# Patient Record
Sex: FEMALE | Race: WHITE | NOT HISPANIC OR LATINO | Employment: OTHER | URBAN - METROPOLITAN AREA
[De-identification: names, ages, dates, MRNs, and addresses within clinical notes are randomized per-mention and may not be internally consistent; named-entity substitution may affect disease eponyms.]

---

## 2017-11-09 ENCOUNTER — ESTABLISHED COMPREHENSIVE EXAM (OUTPATIENT)
Dept: URBAN - METROPOLITAN AREA CLINIC 43 | Facility: CLINIC | Age: 82
End: 2017-11-09

## 2017-11-09 DIAGNOSIS — H43.813: ICD-10-CM

## 2017-11-09 DIAGNOSIS — H35.3222: ICD-10-CM

## 2017-11-09 DIAGNOSIS — H35.3212: ICD-10-CM

## 2017-11-09 PROCEDURE — 4177F TALK PT/CRGVR RE AREDS PREV: CPT

## 2017-11-09 PROCEDURE — 92134 CPTRZ OPH DX IMG PST SGM RTA: CPT

## 2017-11-09 PROCEDURE — 2019F DILATED MACUL EXAM DONE: CPT

## 2017-11-09 PROCEDURE — 1036F TOBACCO NON-USER: CPT

## 2017-11-09 PROCEDURE — 67028 INJECTION EYE DRUG: CPT

## 2017-11-09 PROCEDURE — 92014 COMPRE OPH EXAM EST PT 1/>: CPT

## 2017-11-09 PROCEDURE — 9222550 BILAT EXTENDED OPHTHALMOSCOPY, FIRST

## 2017-11-09 PROCEDURE — G8427 DOCREV CUR MEDS BY ELIG CLIN: HCPCS

## 2017-11-09 PROCEDURE — 92250 FUNDUS PHOTOGRAPHY W/I&R: CPT

## 2017-11-09 PROCEDURE — G8785 BP SCRN NO PERF AT INTERVAL: HCPCS

## 2017-11-09 PROCEDURE — 92235 FLUORESCEIN ANGRPH MLTIFRAME: CPT

## 2017-11-09 ASSESSMENT — VISUAL ACUITY
OS_CC: 20/40-1
OD_CC: 20/400

## 2017-11-09 ASSESSMENT — TONOMETRY
OS_IOP_MMHG: 16
OD_IOP_MMHG: 12

## 2017-11-16 ENCOUNTER — ESTABLISHED COMPREHENSIVE EXAM (OUTPATIENT)
Dept: URBAN - METROPOLITAN AREA CLINIC 43 | Facility: CLINIC | Age: 82
End: 2017-11-16

## 2017-11-16 DIAGNOSIS — H43.813: ICD-10-CM

## 2017-11-16 DIAGNOSIS — H35.3212: ICD-10-CM

## 2017-11-16 DIAGNOSIS — H35.3222: ICD-10-CM

## 2017-11-16 DIAGNOSIS — Z96.1: ICD-10-CM

## 2017-11-16 PROCEDURE — 92015 DETERMINE REFRACTIVE STATE: CPT

## 2017-11-16 PROCEDURE — 4040F PNEUMOC VAC/ADMIN/RCVD: CPT

## 2017-11-16 PROCEDURE — 92014 COMPRE OPH EXAM EST PT 1/>: CPT

## 2017-11-16 PROCEDURE — G8785 BP SCRN NO PERF AT INTERVAL: HCPCS

## 2017-11-16 PROCEDURE — G8428 CUR MEDS NOT DOCUMENT: HCPCS

## 2017-11-16 PROCEDURE — G8482 FLU IMMUNIZE ORDER/ADMIN: HCPCS

## 2017-11-16 PROCEDURE — 1036F TOBACCO NON-USER: CPT

## 2017-11-16 ASSESSMENT — VISUAL ACUITY
OD_CC: J5
OS_CC: 20/40-1
OS_CC: J2
OD_SC: 20/200+1
OD_SC: J10
OS_SC: 20/70
OS_SC: J8
OD_CC: 20/100

## 2017-11-16 ASSESSMENT — TONOMETRY
OS_IOP_MMHG: 15
OD_IOP_MMHG: 12

## 2017-11-17 ENCOUNTER — ESTABLISHED PATIENT (OUTPATIENT)
Dept: URBAN - METROPOLITAN AREA CLINIC 43 | Facility: CLINIC | Age: 82
End: 2017-11-17

## 2017-11-17 DIAGNOSIS — H35.3222: ICD-10-CM

## 2017-11-17 PROCEDURE — 67028 INJECTION EYE DRUG: CPT

## 2017-11-17 ASSESSMENT — VISUAL ACUITY
OS_CC: 20/40
OD_SC: 20/200
OS_SC: 20/70
OD_CC: 20/100+1

## 2017-11-17 ASSESSMENT — TONOMETRY
OS_IOP_MMHG: 11
OD_IOP_MMHG: 13

## 2018-01-05 ENCOUNTER — ESTABLISHED PATIENT (OUTPATIENT)
Dept: URBAN - METROPOLITAN AREA CLINIC 43 | Facility: CLINIC | Age: 83
End: 2018-01-05

## 2018-01-05 DIAGNOSIS — H35.363: ICD-10-CM

## 2018-01-05 DIAGNOSIS — H35.3222: ICD-10-CM

## 2018-01-05 DIAGNOSIS — H35.3212: ICD-10-CM

## 2018-01-05 DIAGNOSIS — H43.813: ICD-10-CM

## 2018-01-05 PROCEDURE — 92012 INTRM OPH EXAM EST PATIENT: CPT

## 2018-01-05 PROCEDURE — G8785 BP SCRN NO PERF AT INTERVAL: HCPCS

## 2018-01-05 PROCEDURE — 92134 CPTRZ OPH DX IMG PST SGM RTA: CPT

## 2018-01-05 PROCEDURE — G8428 CUR MEDS NOT DOCUMENT: HCPCS

## 2018-01-05 PROCEDURE — 67028 INJECTION EYE DRUG: CPT

## 2018-01-05 PROCEDURE — 2019F DILATED MACUL EXAM DONE: CPT

## 2018-01-05 PROCEDURE — 1036F TOBACCO NON-USER: CPT

## 2018-01-05 PROCEDURE — 4177F TALK PT/CRGVR RE AREDS PREV: CPT

## 2018-01-05 ASSESSMENT — TONOMETRY
OS_IOP_MMHG: 11
OD_IOP_MMHG: 10

## 2018-01-05 ASSESSMENT — VISUAL ACUITY
OD_CC: 20/70+1
OS_CC: 20/40

## 2018-01-12 ENCOUNTER — ESTABLISHED PATIENT (OUTPATIENT)
Dept: URBAN - METROPOLITAN AREA CLINIC 43 | Facility: CLINIC | Age: 83
End: 2018-01-12

## 2018-01-12 DIAGNOSIS — H35.3221: ICD-10-CM

## 2018-01-12 PROCEDURE — 67028 INJECTION EYE DRUG: CPT

## 2018-01-12 ASSESSMENT — VISUAL ACUITY
OS_PH: 20/40-1
OD_SC: 20/200-1
OS_SC: 20/60-1

## 2018-01-12 ASSESSMENT — TONOMETRY: OS_IOP_MMHG: 15

## 2018-03-09 ENCOUNTER — ESTABLISHED PATIENT (OUTPATIENT)
Dept: URBAN - METROPOLITAN AREA CLINIC 43 | Facility: CLINIC | Age: 83
End: 2018-03-09

## 2018-03-09 DIAGNOSIS — H35.363: ICD-10-CM

## 2018-03-09 DIAGNOSIS — H35.3212: ICD-10-CM

## 2018-03-09 DIAGNOSIS — H35.3221: ICD-10-CM

## 2018-03-09 DIAGNOSIS — H35.733: ICD-10-CM

## 2018-03-09 DIAGNOSIS — H43.813: ICD-10-CM

## 2018-03-09 PROCEDURE — 92014 COMPRE OPH EXAM EST PT 1/>: CPT

## 2018-03-09 PROCEDURE — 92275 ELECTRORETINOGRAPHY: CPT

## 2018-03-09 PROCEDURE — 92250 FUNDUS PHOTOGRAPHY W/I&R: CPT

## 2018-03-09 PROCEDURE — 6702850 BILATERAL INTRAVITREAL INJECTION

## 2018-03-09 PROCEDURE — 4177F TALK PT/CRGVR RE AREDS PREV: CPT

## 2018-03-09 PROCEDURE — G8785 BP SCRN NO PERF AT INTERVAL: HCPCS

## 2018-03-09 PROCEDURE — 92235 FLUORESCEIN ANGRPH MLTIFRAME: CPT

## 2018-03-09 PROCEDURE — 1036F TOBACCO NON-USER: CPT

## 2018-03-09 PROCEDURE — 2019F DILATED MACUL EXAM DONE: CPT

## 2018-03-09 PROCEDURE — G8427 DOCREV CUR MEDS BY ELIG CLIN: HCPCS

## 2018-03-09 PROCEDURE — 9222650 BILAT EXTENDED OPHTHALMOSCOPY, F/U

## 2018-03-09 ASSESSMENT — VISUAL ACUITY
OS_SC: 20/50-1
OD_SC: CF 3FT

## 2018-03-09 ASSESSMENT — TONOMETRY
OS_IOP_MMHG: 17
OD_IOP_MMHG: 10

## 2018-03-23 ENCOUNTER — ESTABLISHED PATIENT (OUTPATIENT)
Dept: URBAN - METROPOLITAN AREA CLINIC 43 | Facility: CLINIC | Age: 83
End: 2018-03-23

## 2018-03-23 DIAGNOSIS — H35.3212: ICD-10-CM

## 2018-03-23 DIAGNOSIS — Z96.1: ICD-10-CM

## 2018-03-23 DIAGNOSIS — H35.3221: ICD-10-CM

## 2018-03-23 DIAGNOSIS — H35.363: ICD-10-CM

## 2018-03-23 DIAGNOSIS — H43.813: ICD-10-CM

## 2018-03-23 DIAGNOSIS — H35.733: ICD-10-CM

## 2018-03-23 PROCEDURE — 1036F TOBACCO NON-USER: CPT

## 2018-03-23 PROCEDURE — 4177F TALK PT/CRGVR RE AREDS PREV: CPT

## 2018-03-23 PROCEDURE — 2019F DILATED MACUL EXAM DONE: CPT

## 2018-03-23 PROCEDURE — 92014 COMPRE OPH EXAM EST PT 1/>: CPT

## 2018-03-23 PROCEDURE — 92015 DETERMINE REFRACTIVE STATE: CPT

## 2018-03-23 PROCEDURE — G8785 BP SCRN NO PERF AT INTERVAL: HCPCS

## 2018-03-23 PROCEDURE — G8428 CUR MEDS NOT DOCUMENT: HCPCS

## 2018-03-23 ASSESSMENT — TONOMETRY
OD_IOP_MMHG: 12
OS_IOP_MMHG: 14

## 2018-03-23 ASSESSMENT — VISUAL ACUITY
OD_SC: J10
OS_SC: J8
OS_CC: J1
OS_CC: 20/40-2
OD_CC: 20/100+2
OS_SC: 20/70+2
OD_CC: J10
OD_SC: 20/100

## 2018-05-11 ENCOUNTER — ESTABLISHED PATIENT (OUTPATIENT)
Dept: URBAN - METROPOLITAN AREA CLINIC 43 | Facility: CLINIC | Age: 83
End: 2018-05-11

## 2018-05-11 DIAGNOSIS — H35.3212: ICD-10-CM

## 2018-05-11 DIAGNOSIS — H35.3221: ICD-10-CM

## 2018-05-11 DIAGNOSIS — H35.363: ICD-10-CM

## 2018-05-11 DIAGNOSIS — H35.30: ICD-10-CM

## 2018-05-11 DIAGNOSIS — H35.733: ICD-10-CM

## 2018-05-11 DIAGNOSIS — H43.813: ICD-10-CM

## 2018-05-11 PROCEDURE — 1036F TOBACCO NON-USER: CPT

## 2018-05-11 PROCEDURE — 2019F DILATED MACUL EXAM DONE: CPT

## 2018-05-11 PROCEDURE — G8785 BP SCRN NO PERF AT INTERVAL: HCPCS

## 2018-05-11 PROCEDURE — 92134 CPTRZ OPH DX IMG PST SGM RTA: CPT

## 2018-05-11 PROCEDURE — 6702850 BILATERAL INTRAVITREAL INJECTION

## 2018-05-11 PROCEDURE — 4177F TALK PT/CRGVR RE AREDS PREV: CPT

## 2018-05-11 PROCEDURE — 9222650 BILAT EXTENDED OPHTHALMOSCOPY, F/U

## 2018-05-11 PROCEDURE — G8427 DOCREV CUR MEDS BY ELIG CLIN: HCPCS

## 2018-05-11 PROCEDURE — 92014 COMPRE OPH EXAM EST PT 1/>: CPT

## 2018-05-11 ASSESSMENT — TONOMETRY
OS_IOP_MMHG: 12
OD_IOP_MMHG: 11

## 2018-05-11 ASSESSMENT — VISUAL ACUITY
OS_CC: 20/40-1
OD_CC: 20/200-1

## 2018-11-16 ENCOUNTER — ESTABLISHED COMPREHENSIVE EXAM (OUTPATIENT)
Dept: URBAN - METROPOLITAN AREA CLINIC 43 | Facility: CLINIC | Age: 83
End: 2018-11-16

## 2018-11-16 DIAGNOSIS — H43.813: ICD-10-CM

## 2018-11-16 DIAGNOSIS — H35.363: ICD-10-CM

## 2018-11-16 DIAGNOSIS — H35.733: ICD-10-CM

## 2018-11-16 DIAGNOSIS — H35.3212: ICD-10-CM

## 2018-11-16 DIAGNOSIS — H35.3221: ICD-10-CM

## 2018-11-16 PROCEDURE — 92134 CPTRZ OPH DX IMG PST SGM RTA: CPT

## 2018-11-16 PROCEDURE — 6702850 BILATERAL INTRAVITREAL INJECTION

## 2018-11-16 PROCEDURE — G8428 CUR MEDS NOT DOCUMENT: HCPCS

## 2018-11-16 PROCEDURE — G9903 PT SCRN TBCO ID AS NON USER: HCPCS

## 2018-11-16 PROCEDURE — G8785 BP SCRN NO PERF AT INTERVAL: HCPCS

## 2018-11-16 PROCEDURE — 92014 COMPRE OPH EXAM EST PT 1/>: CPT

## 2018-11-16 PROCEDURE — 1036F TOBACCO NON-USER: CPT

## 2018-11-16 PROCEDURE — 92242 FLUORESCEIN&ICG ANGIOGRAPHY: CPT

## 2018-11-16 PROCEDURE — 4177F TALK PT/CRGVR RE AREDS PREV: CPT

## 2018-11-16 PROCEDURE — 9222650 BILAT EXTENDED OPHTHALMOSCOPY, F/U

## 2018-11-16 PROCEDURE — 2019F DILATED MACUL EXAM DONE: CPT

## 2018-11-16 ASSESSMENT — VISUAL ACUITY
OS_CC: 20/40+2
OD_CC: CF 3FT

## 2018-11-16 ASSESSMENT — TONOMETRY
OS_IOP_MMHG: 15
OD_IOP_MMHG: 10

## 2019-01-18 ENCOUNTER — ESTABLISHED COMPREHENSIVE EXAM (OUTPATIENT)
Dept: URBAN - METROPOLITAN AREA CLINIC 43 | Facility: CLINIC | Age: 84
End: 2019-01-18

## 2019-01-18 DIAGNOSIS — H35.3212: ICD-10-CM

## 2019-01-18 DIAGNOSIS — H35.733: ICD-10-CM

## 2019-01-18 DIAGNOSIS — H31.013: ICD-10-CM

## 2019-01-18 DIAGNOSIS — H35.363: ICD-10-CM

## 2019-01-18 DIAGNOSIS — H43.813: ICD-10-CM

## 2019-01-18 DIAGNOSIS — H35.3221: ICD-10-CM

## 2019-01-18 PROCEDURE — G9903 PT SCRN TBCO ID AS NON USER: HCPCS

## 2019-01-18 PROCEDURE — 92273 FULL FIELD ERG W/I&R: CPT

## 2019-01-18 PROCEDURE — 6702850 BILATERAL INTRAVITREAL INJECTION

## 2019-01-18 PROCEDURE — 92242 FLUORESCEIN&ICG ANGIOGRAPHY: CPT

## 2019-01-18 PROCEDURE — 2019F DILATED MACUL EXAM DONE: CPT

## 2019-01-18 PROCEDURE — G8428 CUR MEDS NOT DOCUMENT: HCPCS

## 2019-01-18 PROCEDURE — 92134 CPTRZ OPH DX IMG PST SGM RTA: CPT

## 2019-01-18 PROCEDURE — G8785 BP SCRN NO PERF AT INTERVAL: HCPCS

## 2019-01-18 PROCEDURE — 4177F TALK PT/CRGVR RE AREDS PREV: CPT

## 2019-01-18 PROCEDURE — 1036F TOBACCO NON-USER: CPT

## 2019-01-18 PROCEDURE — 92012 INTRM OPH EXAM EST PATIENT: CPT

## 2019-01-18 ASSESSMENT — VISUAL ACUITY
OS_CC: 20/40
OD_CC: 20/200

## 2019-01-18 ASSESSMENT — TONOMETRY
OS_IOP_MMHG: 11
OD_IOP_MMHG: 8

## 2019-03-22 ENCOUNTER — ESTABLISHED PATIENT (OUTPATIENT)
Dept: URBAN - METROPOLITAN AREA CLINIC 43 | Facility: CLINIC | Age: 84
End: 2019-03-22

## 2019-03-22 DIAGNOSIS — Z96.1: ICD-10-CM

## 2019-03-22 DIAGNOSIS — H35.733: ICD-10-CM

## 2019-03-22 DIAGNOSIS — H35.3212: ICD-10-CM

## 2019-03-22 DIAGNOSIS — H35.363: ICD-10-CM

## 2019-03-22 DIAGNOSIS — H35.3221: ICD-10-CM

## 2019-03-22 DIAGNOSIS — H31.013: ICD-10-CM

## 2019-03-22 DIAGNOSIS — H35.30: ICD-10-CM

## 2019-03-22 DIAGNOSIS — H43.813: ICD-10-CM

## 2019-03-22 PROCEDURE — 92250 FUNDUS PHOTOGRAPHY W/I&R: CPT

## 2019-03-22 PROCEDURE — 92242 FLUORESCEIN&ICG ANGIOGRAPHY: CPT

## 2019-03-22 PROCEDURE — 67028 INJECTION EYE DRUG: CPT

## 2019-03-22 PROCEDURE — 92226 OPHTHALMOSCOPY (SUB): CPT

## 2019-03-22 PROCEDURE — 92012 INTRM OPH EXAM EST PATIENT: CPT

## 2019-03-22 ASSESSMENT — VISUAL ACUITY
OS_CC: 20/40-2
OD_CC: 20/200

## 2019-03-22 ASSESSMENT — TONOMETRY
OD_IOP_MMHG: 13
OS_IOP_MMHG: 18

## 2019-04-05 ENCOUNTER — ESTABLISHED COMPREHENSIVE EXAM (OUTPATIENT)
Dept: URBAN - METROPOLITAN AREA CLINIC 43 | Facility: CLINIC | Age: 84
End: 2019-04-05

## 2019-04-05 DIAGNOSIS — H35.30: ICD-10-CM

## 2019-04-05 DIAGNOSIS — H43.813: ICD-10-CM

## 2019-04-05 DIAGNOSIS — Z96.1: ICD-10-CM

## 2019-04-05 DIAGNOSIS — H31.013: ICD-10-CM

## 2019-04-05 DIAGNOSIS — H35.3221: ICD-10-CM

## 2019-04-05 DIAGNOSIS — H35.3212: ICD-10-CM

## 2019-04-05 PROCEDURE — 92014 COMPRE OPH EXAM EST PT 1/>: CPT

## 2019-04-05 PROCEDURE — 92015 DETERMINE REFRACTIVE STATE: CPT

## 2019-04-05 ASSESSMENT — VISUAL ACUITY
OS_SC: 20/50-2
OD_SC: 20/200
OD_CC: 20/200
OD_SC: <J12
OD_CC: J12
OS_SC: J4
OS_CC: J2
OS_CC: 20/40-1

## 2019-04-05 ASSESSMENT — TONOMETRY
OD_IOP_MMHG: 13
OS_IOP_MMHG: 18

## 2019-05-17 ENCOUNTER — ESTABLISHED COMPREHENSIVE EXAM (OUTPATIENT)
Dept: URBAN - METROPOLITAN AREA CLINIC 43 | Facility: CLINIC | Age: 84
End: 2019-05-17

## 2019-05-17 DIAGNOSIS — H35.3221: ICD-10-CM

## 2019-05-17 DIAGNOSIS — H35.3212: ICD-10-CM

## 2019-05-17 DIAGNOSIS — H31.013: ICD-10-CM

## 2019-05-17 DIAGNOSIS — H35.733: ICD-10-CM

## 2019-05-17 PROCEDURE — 92012 INTRM OPH EXAM EST PATIENT: CPT

## 2019-05-17 PROCEDURE — 92273 FULL FIELD ERG W/I&R: CPT

## 2019-05-17 PROCEDURE — 92235 FLUORESCEIN ANGRPH MLTIFRAME: CPT

## 2019-05-17 PROCEDURE — 92250 FUNDUS PHOTOGRAPHY W/I&R: CPT

## 2019-05-17 PROCEDURE — 6702850 BILATERAL INTRAVITREAL INJECTION

## 2019-05-17 PROCEDURE — 92134 CPTRZ OPH DX IMG PST SGM RTA: CPT

## 2019-05-17 ASSESSMENT — VISUAL ACUITY
OS_CC: J3
OD_CC: >J12
OS_CC: 20/40+2
OD_CC: 20/200

## 2019-05-17 ASSESSMENT — TONOMETRY
OS_IOP_MMHG: 15
OD_IOP_MMHG: 12

## 2019-11-11 ENCOUNTER — ESTABLISHED COMPREHENSIVE EXAM (OUTPATIENT)
Dept: URBAN - METROPOLITAN AREA CLINIC 43 | Facility: CLINIC | Age: 84
End: 2019-11-11

## 2019-11-11 DIAGNOSIS — H43.813: ICD-10-CM

## 2019-11-11 DIAGNOSIS — H35.733: ICD-10-CM

## 2019-11-11 DIAGNOSIS — H35.3221: ICD-10-CM

## 2019-11-11 DIAGNOSIS — H35.3212: ICD-10-CM

## 2019-11-11 DIAGNOSIS — H35.363: ICD-10-CM

## 2019-11-11 DIAGNOSIS — H31.013: ICD-10-CM

## 2019-11-11 PROCEDURE — 92014 COMPRE OPH EXAM EST PT 1/>: CPT

## 2019-11-11 PROCEDURE — 92250 FUNDUS PHOTOGRAPHY W/I&R: CPT

## 2019-11-11 PROCEDURE — 92134 CPTRZ OPH DX IMG PST SGM RTA: CPT

## 2019-11-11 PROCEDURE — 9222650 BILAT EXTENDED OPHTHALMOSCOPY, F/U

## 2019-11-11 PROCEDURE — 6702850 BILATERAL INTRAVITREAL INJECTION

## 2019-11-11 ASSESSMENT — VISUAL ACUITY
OS_CC: 20/50+1
OD_CC: 20/400

## 2019-11-11 ASSESSMENT — TONOMETRY
OS_IOP_MMHG: 15
OD_IOP_MMHG: 11

## 2020-01-13 ENCOUNTER — ESTABLISHED PATIENT (OUTPATIENT)
Dept: URBAN - METROPOLITAN AREA CLINIC 43 | Facility: CLINIC | Age: 85
End: 2020-01-13

## 2020-01-13 DIAGNOSIS — H35.3221: ICD-10-CM

## 2020-01-13 DIAGNOSIS — H35.3212: ICD-10-CM

## 2020-01-13 PROCEDURE — 6702850 BILATERAL INTRAVITREAL INJECTION

## 2020-01-13 PROCEDURE — 92250 FUNDUS PHOTOGRAPHY W/I&R: CPT

## 2020-01-13 PROCEDURE — 92012 INTRM OPH EXAM EST PATIENT: CPT

## 2020-01-13 ASSESSMENT — VISUAL ACUITY
OS_CC: 20/60
OD_CC: 20/400

## 2020-01-13 ASSESSMENT — TONOMETRY
OS_IOP_MMHG: 15
OD_IOP_MMHG: 11

## 2020-03-16 ENCOUNTER — ESTABLISHED PATIENT (OUTPATIENT)
Dept: URBAN - METROPOLITAN AREA CLINIC 43 | Facility: CLINIC | Age: 85
End: 2020-03-16

## 2020-03-16 DIAGNOSIS — H35.3221: ICD-10-CM

## 2020-03-16 DIAGNOSIS — H35.3212: ICD-10-CM

## 2020-03-16 PROCEDURE — J0178PRE EYLEA PREFILLED SYRINGE

## 2020-03-16 PROCEDURE — 92134 CPTRZ OPH DX IMG PST SGM RTA: CPT

## 2020-03-16 PROCEDURE — 92250 FUNDUS PHOTOGRAPHY W/I&R: CPT

## 2020-03-16 PROCEDURE — 6702850 BILATERAL INTRAVITREAL INJECTION

## 2020-03-16 PROCEDURE — 92012 INTRM OPH EXAM EST PATIENT: CPT

## 2020-03-16 ASSESSMENT — TONOMETRY
OS_IOP_MMHG: 17
OD_IOP_MMHG: 14

## 2020-03-16 ASSESSMENT — VISUAL ACUITY
OD_CC: 20/400+1
OS_CC: 20/40-2

## 2020-05-15 ENCOUNTER — ESTABLISHED PATIENT (OUTPATIENT)
Dept: URBAN - METROPOLITAN AREA CLINIC 43 | Facility: CLINIC | Age: 85
End: 2020-05-15

## 2020-05-15 DIAGNOSIS — H43.813: ICD-10-CM

## 2020-05-15 DIAGNOSIS — H31.013: ICD-10-CM

## 2020-05-15 DIAGNOSIS — H35.733: ICD-10-CM

## 2020-05-15 DIAGNOSIS — H35.363: ICD-10-CM

## 2020-05-15 DIAGNOSIS — H35.3221: ICD-10-CM

## 2020-05-15 DIAGNOSIS — H35.3212: ICD-10-CM

## 2020-05-15 PROCEDURE — 92014 COMPRE OPH EXAM EST PT 1/>: CPT

## 2020-05-15 PROCEDURE — 92250 FUNDUS PHOTOGRAPHY W/I&R: CPT

## 2020-05-15 PROCEDURE — 6702850 BILATERAL INTRAVITREAL INJECTION

## 2020-05-15 PROCEDURE — J0178PRE EYLEA PREFILLED SYRINGE

## 2020-05-15 ASSESSMENT — VISUAL ACUITY
OS_CC: 20/30-1
OD_CC: 20/400

## 2020-05-18 ENCOUNTER — EST. PATIENT EMERGENCY (OUTPATIENT)
Dept: URBAN - METROPOLITAN AREA CLINIC 43 | Facility: CLINIC | Age: 85
End: 2020-05-18

## 2020-05-18 DIAGNOSIS — H20.9: ICD-10-CM

## 2020-05-18 PROCEDURE — 92014 COMPRE OPH EXAM EST PT 1/>: CPT

## 2020-05-18 PROCEDURE — 65800 DRAINAGE OF EYE: CPT

## 2020-05-18 PROCEDURE — 67028 INJECTION EYE DRUG: CPT

## 2020-05-18 RX ORDER — DUREZOL 0.5 MG/ML
1 EMULSION OPHTHALMIC
Start: 2020-05-18

## 2020-05-18 RX ORDER — ATROPINE SULFATE 10 MG/ML
1 SOLUTION/ DROPS OPHTHALMIC ONCE A DAY
Start: 2020-05-18

## 2020-05-18 RX ORDER — OFLOXACIN 3 MG/ML
1 SOLUTION OPHTHALMIC
Start: 2020-05-18

## 2020-05-18 ASSESSMENT — VISUAL ACUITY
OS_CC: CF 1FT
OD_CC: 20/400

## 2020-05-18 ASSESSMENT — TONOMETRY: OS_IOP_MMHG: 23

## 2020-05-19 ENCOUNTER — ESTABLISHED PATIENT (OUTPATIENT)
Dept: URBAN - METROPOLITAN AREA CLINIC 46 | Facility: CLINIC | Age: 85
End: 2020-05-19

## 2020-05-19 DIAGNOSIS — H20.9: ICD-10-CM

## 2020-05-19 PROCEDURE — 92012 INTRM OPH EXAM EST PATIENT: CPT

## 2020-05-19 ASSESSMENT — TONOMETRY: OS_IOP_MMHG: 16

## 2020-05-20 ENCOUNTER — ESTABLISHED PATIENT (OUTPATIENT)
Dept: URBAN - METROPOLITAN AREA CLINIC 43 | Facility: CLINIC | Age: 85
End: 2020-05-20

## 2020-05-20 DIAGNOSIS — H20.9: ICD-10-CM

## 2020-05-20 PROCEDURE — 92012 INTRM OPH EXAM EST PATIENT: CPT

## 2020-05-20 ASSESSMENT — TONOMETRY: OS_IOP_MMHG: 22

## 2020-05-21 ENCOUNTER — ESTABLISHED PATIENT (OUTPATIENT)
Dept: URBAN - METROPOLITAN AREA CLINIC 39 | Facility: CLINIC | Age: 85
End: 2020-05-21

## 2020-05-21 DIAGNOSIS — H20.9: ICD-10-CM

## 2020-05-21 PROCEDURE — 92012 INTRM OPH EXAM EST PATIENT: CPT

## 2020-05-21 ASSESSMENT — TONOMETRY: OS_IOP_MMHG: 29

## 2020-05-22 ENCOUNTER — ESTABLISHED PATIENT (OUTPATIENT)
Dept: URBAN - METROPOLITAN AREA CLINIC 43 | Facility: CLINIC | Age: 85
End: 2020-05-22

## 2020-05-22 DIAGNOSIS — H20.9: ICD-10-CM

## 2020-05-22 PROCEDURE — 92012 INTRM OPH EXAM EST PATIENT: CPT

## 2020-05-22 RX ORDER — BRIMONIDINE TARTRATE, TIMOLOL MALEATE 2; 5 MG/ML; MG/ML
1 SOLUTION/ DROPS OPHTHALMIC TWICE A DAY
Start: 2020-05-21

## 2020-05-22 ASSESSMENT — TONOMETRY: OS_IOP_MMHG: 14

## 2020-05-27 ENCOUNTER — ESTABLISHED PATIENT (OUTPATIENT)
Dept: URBAN - METROPOLITAN AREA CLINIC 43 | Facility: CLINIC | Age: 85
End: 2020-05-27

## 2020-05-27 DIAGNOSIS — H20.9: ICD-10-CM

## 2020-05-27 PROCEDURE — 92012 INTRM OPH EXAM EST PATIENT: CPT

## 2020-05-27 ASSESSMENT — TONOMETRY: OS_IOP_MMHG: 10

## 2020-06-01 ENCOUNTER — ESTABLISHED PATIENT (OUTPATIENT)
Dept: URBAN - METROPOLITAN AREA CLINIC 43 | Facility: CLINIC | Age: 85
End: 2020-06-01

## 2020-06-01 VITALS — SYSTOLIC BLOOD PRESSURE: 124 MMHG | DIASTOLIC BLOOD PRESSURE: 62 MMHG | HEIGHT: 55 IN

## 2020-06-01 DIAGNOSIS — H44.002: ICD-10-CM

## 2020-06-01 DIAGNOSIS — H20.9: ICD-10-CM

## 2020-06-01 PROCEDURE — 99214 OFFICE O/P EST MOD 30 MIN: CPT

## 2020-06-01 RX ORDER — OFLOXACIN 3 MG/ML
1 SOLUTION OPHTHALMIC
Start: 2020-06-05

## 2020-06-01 ASSESSMENT — TONOMETRY: OS_IOP_MMHG: 10

## 2020-06-04 ENCOUNTER — SURGERY/PROCEDURE (OUTPATIENT)
Dept: URBAN - METROPOLITAN AREA CLINIC 43 | Facility: CLINIC | Age: 85
End: 2020-06-04

## 2020-06-04 DIAGNOSIS — H44.002: ICD-10-CM

## 2020-06-04 PROCEDURE — 67036 REMOVAL OF INNER EYE FLUID: CPT

## 2020-06-05 ENCOUNTER — 1 DAY POST-OP (OUTPATIENT)
Dept: URBAN - METROPOLITAN AREA CLINIC 43 | Facility: CLINIC | Age: 85
End: 2020-06-05

## 2020-06-05 DIAGNOSIS — H44.002: ICD-10-CM

## 2020-06-05 PROCEDURE — 99024 POSTOP FOLLOW-UP VISIT: CPT

## 2020-06-05 ASSESSMENT — TONOMETRY: OS_IOP_MMHG: 8

## 2020-06-05 ASSESSMENT — VISUAL ACUITY: OS_SC: CF 1FT

## 2020-06-12 ENCOUNTER — POST-OP (OUTPATIENT)
Dept: URBAN - METROPOLITAN AREA CLINIC 43 | Facility: CLINIC | Age: 85
End: 2020-06-12

## 2020-06-12 DIAGNOSIS — H44.002: ICD-10-CM

## 2020-06-12 PROCEDURE — 99024 POSTOP FOLLOW-UP VISIT: CPT

## 2020-06-12 PROCEDURE — 92134 CPTRZ OPH DX IMG PST SGM RTA: CPT

## 2020-06-12 ASSESSMENT — VISUAL ACUITY
OD_SC: 20/200
OS_SC: 20/400+1

## 2020-06-26 ENCOUNTER — POST-OP (OUTPATIENT)
Dept: URBAN - METROPOLITAN AREA CLINIC 43 | Facility: CLINIC | Age: 85
End: 2020-06-26

## 2020-06-26 DIAGNOSIS — H35.30: ICD-10-CM

## 2020-06-26 DIAGNOSIS — H44.002: ICD-10-CM

## 2020-06-26 DIAGNOSIS — H43.813: ICD-10-CM

## 2020-06-26 DIAGNOSIS — H35.363: ICD-10-CM

## 2020-06-26 DIAGNOSIS — H35.733: ICD-10-CM

## 2020-06-26 DIAGNOSIS — H35.3221: ICD-10-CM

## 2020-06-26 PROCEDURE — 99024 POSTOP FOLLOW-UP VISIT: CPT

## 2020-06-26 ASSESSMENT — VISUAL ACUITY
OD_SC: CF 5FT
OD_PH: 20/400
OS_SC: 20/100-1

## 2020-06-26 ASSESSMENT — TONOMETRY: OS_IOP_MMHG: 22

## 2020-07-27 ENCOUNTER — POST-OP (OUTPATIENT)
Dept: URBAN - METROPOLITAN AREA CLINIC 43 | Facility: CLINIC | Age: 85
End: 2020-07-27

## 2020-07-27 DIAGNOSIS — H35.363: ICD-10-CM

## 2020-07-27 DIAGNOSIS — H43.813: ICD-10-CM

## 2020-07-27 DIAGNOSIS — H35.733: ICD-10-CM

## 2020-07-27 DIAGNOSIS — H35.3221: ICD-10-CM

## 2020-07-27 DIAGNOSIS — H35.30: ICD-10-CM

## 2020-07-27 DIAGNOSIS — H44.002: ICD-10-CM

## 2020-07-27 PROCEDURE — 99024 POSTOP FOLLOW-UP VISIT: CPT

## 2020-07-27 ASSESSMENT — VISUAL ACUITY
OD_SC: CF 3FT
OS_SC: 20/200

## 2020-07-27 ASSESSMENT — TONOMETRY
OS_IOP_MMHG: 25
OD_IOP_MMHG: 12

## 2020-08-14 ENCOUNTER — POST-OP (OUTPATIENT)
Dept: URBAN - METROPOLITAN AREA CLINIC 43 | Facility: CLINIC | Age: 85
End: 2020-08-14

## 2020-08-14 DIAGNOSIS — H44.002: ICD-10-CM

## 2020-08-14 PROCEDURE — 92134 CPTRZ OPH DX IMG PST SGM RTA: CPT

## 2020-08-14 PROCEDURE — 99024 POSTOP FOLLOW-UP VISIT: CPT

## 2020-08-14 ASSESSMENT — VISUAL ACUITY
OS_PH: 20/40
OD_SC: CF 4FT
OS_SC: 20/70

## 2020-08-14 ASSESSMENT — TONOMETRY
OS_IOP_MMHG: 20
OD_IOP_MMHG: 13

## 2020-09-11 ENCOUNTER — DILATED FUNDUS EXAM (OUTPATIENT)
Dept: URBAN - METROPOLITAN AREA CLINIC 43 | Facility: CLINIC | Age: 85
End: 2020-09-11

## 2020-09-11 DIAGNOSIS — H35.733: ICD-10-CM

## 2020-09-11 DIAGNOSIS — H35.3221: ICD-10-CM

## 2020-09-11 DIAGNOSIS — H35.363: ICD-10-CM

## 2020-09-11 DIAGNOSIS — H44.002: ICD-10-CM

## 2020-09-11 DIAGNOSIS — H35.3212: ICD-10-CM

## 2020-09-11 DIAGNOSIS — H43.813: ICD-10-CM

## 2020-09-11 PROCEDURE — 92014 COMPRE OPH EXAM EST PT 1/>: CPT

## 2020-09-11 PROCEDURE — 92250 FUNDUS PHOTOGRAPHY W/I&R: CPT

## 2020-09-11 ASSESSMENT — TONOMETRY
OS_IOP_MMHG: 20
OD_IOP_MMHG: 18

## 2020-09-11 ASSESSMENT — VISUAL ACUITY
OD_SC: 20/400
OS_SC: 20/100

## 2020-10-01 NOTE — PATIENT DISCUSSION
Cataract APPEARS VISUALLY SIGNIFICANT and patient advised to SEE DR ANTOINE for evaluation and treatment.

## 2020-10-01 NOTE — PATIENT DISCUSSION
10 1 20 - NEW - [facility] 28 OU - TO START T 1/2 AND TO F/U DR ANTOINE FOR FURTHER EVALUATION AND ONGOING TREATMENT.

## 2020-10-09 ENCOUNTER — ESTABLISHED PATIENT (OUTPATIENT)
Dept: URBAN - METROPOLITAN AREA CLINIC 43 | Facility: CLINIC | Age: 85
End: 2020-10-09

## 2020-10-09 DIAGNOSIS — H35.3221: ICD-10-CM

## 2020-10-09 DIAGNOSIS — H40.052: ICD-10-CM

## 2020-10-09 DIAGNOSIS — H35.363: ICD-10-CM

## 2020-10-09 DIAGNOSIS — H35.733: ICD-10-CM

## 2020-10-09 DIAGNOSIS — H43.813: ICD-10-CM

## 2020-10-09 DIAGNOSIS — H35.30: ICD-10-CM

## 2020-10-09 DIAGNOSIS — H35.3212: ICD-10-CM

## 2020-10-09 PROCEDURE — 92250 FUNDUS PHOTOGRAPHY W/I&R: CPT

## 2020-10-09 PROCEDURE — 92014 COMPRE OPH EXAM EST PT 1/>: CPT

## 2020-10-09 PROCEDURE — 67028 INJECTION EYE DRUG: CPT

## 2020-10-09 RX ORDER — BRINZOLAMIDE/BRIMONIDINE TARTRATE 10; 2 MG/ML; MG/ML
1 SUSPENSION/ DROPS OPHTHALMIC TWICE A DAY
Start: 2020-10-09

## 2020-10-09 ASSESSMENT — TONOMETRY
OS_IOP_MMHG: 35
OD_IOP_MMHG: 13
OS_IOP_MMHG: 28

## 2020-10-09 ASSESSMENT — VISUAL ACUITY
OS_SC: 20/400
OD_PH: 20/400-1
OS_PH: 20/200
OD_SC: CF 4FT

## 2020-10-16 ENCOUNTER — IOP CHECK (OUTPATIENT)
Dept: URBAN - METROPOLITAN AREA CLINIC 43 | Facility: CLINIC | Age: 85
End: 2020-10-16

## 2020-10-16 DIAGNOSIS — H40.052: ICD-10-CM

## 2020-10-16 PROCEDURE — 92012 INTRM OPH EXAM EST PATIENT: CPT

## 2020-10-16 PROCEDURE — 92202 OPSCPY EXTND ON/MAC DRAW: CPT

## 2020-10-16 ASSESSMENT — VISUAL ACUITY
OS_PH: 20/60-2
OS_CC: 20/80+2
OD_CC: 20/200

## 2020-11-13 ENCOUNTER — ESTABLISHED PATIENT (OUTPATIENT)
Dept: URBAN - METROPOLITAN AREA CLINIC 43 | Facility: CLINIC | Age: 85
End: 2020-11-13

## 2020-11-13 DIAGNOSIS — H40.89: ICD-10-CM

## 2020-11-13 DIAGNOSIS — H34.8121: ICD-10-CM

## 2020-11-13 DIAGNOSIS — H35.3212: ICD-10-CM

## 2020-11-13 DIAGNOSIS — H35.733: ICD-10-CM

## 2020-11-13 DIAGNOSIS — H35.30: ICD-10-CM

## 2020-11-13 DIAGNOSIS — H43.813: ICD-10-CM

## 2020-11-13 DIAGNOSIS — H21.1X2: ICD-10-CM

## 2020-11-13 DIAGNOSIS — H40.052: ICD-10-CM

## 2020-11-13 DIAGNOSIS — H35.3221: ICD-10-CM

## 2020-11-13 DIAGNOSIS — H35.363: ICD-10-CM

## 2020-11-13 PROCEDURE — 92202 OPSCPY EXTND ON/MAC DRAW: CPT

## 2020-11-13 PROCEDURE — 92134 CPTRZ OPH DX IMG PST SGM RTA: CPT

## 2020-11-13 PROCEDURE — 67028 INJECTION EYE DRUG: CPT

## 2020-11-13 PROCEDURE — 92012 INTRM OPH EXAM EST PATIENT: CPT

## 2020-11-13 ASSESSMENT — TONOMETRY
OD_IOP_MMHG: 13
OS_IOP_MMHG: 18

## 2020-11-13 ASSESSMENT — VISUAL ACUITY
OS_CC: 20/60+2
OD_CC: CF 4FT

## 2020-12-09 ENCOUNTER — ESTABLISHED COMPREHENSIVE EXAM (OUTPATIENT)
Dept: URBAN - METROPOLITAN AREA CLINIC 43 | Facility: CLINIC | Age: 85
End: 2020-12-09

## 2020-12-09 DIAGNOSIS — H31.013: ICD-10-CM

## 2020-12-09 DIAGNOSIS — H40.89: ICD-10-CM

## 2020-12-09 DIAGNOSIS — H21.1X2: ICD-10-CM

## 2020-12-09 DIAGNOSIS — H34.8121: ICD-10-CM

## 2020-12-09 DIAGNOSIS — H35.3221: ICD-10-CM

## 2020-12-09 DIAGNOSIS — H35.30: ICD-10-CM

## 2020-12-09 DIAGNOSIS — H35.363: ICD-10-CM

## 2020-12-09 DIAGNOSIS — H35.3212: ICD-10-CM

## 2020-12-09 DIAGNOSIS — H35.733: ICD-10-CM

## 2020-12-09 DIAGNOSIS — H43.813: ICD-10-CM

## 2020-12-09 DIAGNOSIS — H40.052: ICD-10-CM

## 2020-12-09 DIAGNOSIS — Z96.1: ICD-10-CM

## 2020-12-09 PROCEDURE — 92014 COMPRE OPH EXAM EST PT 1/>: CPT

## 2020-12-09 PROCEDURE — 92015 DETERMINE REFRACTIVE STATE: CPT

## 2020-12-09 PROCEDURE — 92133 CPTRZD OPH DX IMG PST SGM ON: CPT

## 2020-12-09 ASSESSMENT — VISUAL ACUITY
OS_CC: 20/60-2
OS_SC: 20/400
OS_SC: J4-
OD_CC: J12
OD_SC: <J12
OD_SC: 20/400
OS_CC: J8-
OD_CC: CF 4FT

## 2020-12-09 ASSESSMENT — TONOMETRY
OS_IOP_MMHG: 27
OD_IOP_MMHG: 14

## 2020-12-11 ENCOUNTER — IOP CHECK (OUTPATIENT)
Dept: URBAN - METROPOLITAN AREA CLINIC 43 | Facility: CLINIC | Age: 85
End: 2020-12-11

## 2020-12-11 DIAGNOSIS — H35.3221: ICD-10-CM

## 2020-12-11 DIAGNOSIS — H35.363: ICD-10-CM

## 2020-12-11 DIAGNOSIS — H35.30: ICD-10-CM

## 2020-12-11 DIAGNOSIS — H40.052: ICD-10-CM

## 2020-12-11 DIAGNOSIS — H40.1122: ICD-10-CM

## 2020-12-11 DIAGNOSIS — H40.89: ICD-10-CM

## 2020-12-11 DIAGNOSIS — H43.813: ICD-10-CM

## 2020-12-11 DIAGNOSIS — H34.8121: ICD-10-CM

## 2020-12-11 DIAGNOSIS — H35.3212: ICD-10-CM

## 2020-12-11 DIAGNOSIS — H21.1X2: ICD-10-CM

## 2020-12-11 PROCEDURE — 92083 EXTENDED VISUAL FIELD XM: CPT

## 2020-12-11 PROCEDURE — 92012 INTRM OPH EXAM EST PATIENT: CPT

## 2020-12-11 ASSESSMENT — VISUAL ACUITY
OS_CC: 20/70-1
OD_CC: 20/400

## 2020-12-11 ASSESSMENT — TONOMETRY
OD_IOP_MMHG: 10
OS_IOP_MMHG: 10

## 2020-12-18 ENCOUNTER — ESTABLISHED PATIENT (OUTPATIENT)
Dept: URBAN - METROPOLITAN AREA CLINIC 43 | Facility: CLINIC | Age: 85
End: 2020-12-18

## 2020-12-18 DIAGNOSIS — H40.1122: ICD-10-CM

## 2020-12-18 DIAGNOSIS — H35.363: ICD-10-CM

## 2020-12-18 DIAGNOSIS — H43.813: ICD-10-CM

## 2020-12-18 DIAGNOSIS — H35.733: ICD-10-CM

## 2020-12-18 DIAGNOSIS — H31.013: ICD-10-CM

## 2020-12-18 DIAGNOSIS — H40.052: ICD-10-CM

## 2020-12-18 DIAGNOSIS — H40.89: ICD-10-CM

## 2020-12-18 DIAGNOSIS — H35.3221: ICD-10-CM

## 2020-12-18 DIAGNOSIS — H34.8121: ICD-10-CM

## 2020-12-18 DIAGNOSIS — H21.1X2: ICD-10-CM

## 2020-12-18 DIAGNOSIS — H35.3212: ICD-10-CM

## 2020-12-18 PROCEDURE — 92273 FULL FIELD ERG W/I&R: CPT

## 2020-12-18 PROCEDURE — 92014 COMPRE OPH EXAM EST PT 1/>: CPT

## 2020-12-18 PROCEDURE — 67028 INJECTION EYE DRUG: CPT

## 2020-12-18 PROCEDURE — 92250 FUNDUS PHOTOGRAPHY W/I&R: CPT

## 2020-12-18 PROCEDURE — 92134 CPTRZ OPH DX IMG PST SGM RTA: CPT

## 2020-12-18 RX ORDER — BRIMONIDINE TARTRATE 2 MG/MG: 1 SOLUTION/ DROPS OPHTHALMIC TWICE A DAY

## 2020-12-18 ASSESSMENT — VISUAL ACUITY
OS_CC: 20/60-2
OD_CC: 20/400
OS_PH: 20/50

## 2020-12-18 ASSESSMENT — TONOMETRY
OS_IOP_MMHG: 11
OD_IOP_MMHG: 08

## 2021-01-22 ENCOUNTER — ESTABLISHED PATIENT (OUTPATIENT)
Dept: URBAN - METROPOLITAN AREA CLINIC 43 | Facility: CLINIC | Age: 86
End: 2021-01-22

## 2021-01-22 DIAGNOSIS — H35.3221: ICD-10-CM

## 2021-01-22 DIAGNOSIS — H40.1122: ICD-10-CM

## 2021-01-22 DIAGNOSIS — H40.89: ICD-10-CM

## 2021-01-22 DIAGNOSIS — H35.30: ICD-10-CM

## 2021-01-22 DIAGNOSIS — H35.363: ICD-10-CM

## 2021-01-22 DIAGNOSIS — H35.3212: ICD-10-CM

## 2021-01-22 DIAGNOSIS — H40.052: ICD-10-CM

## 2021-01-22 DIAGNOSIS — H35.733: ICD-10-CM

## 2021-01-22 DIAGNOSIS — H34.8121: ICD-10-CM

## 2021-01-22 DIAGNOSIS — H21.1X2: ICD-10-CM

## 2021-01-22 DIAGNOSIS — H43.813: ICD-10-CM

## 2021-01-22 DIAGNOSIS — H31.013: ICD-10-CM

## 2021-01-22 PROCEDURE — 92202 OPSCPY EXTND ON/MAC DRAW: CPT

## 2021-01-22 PROCEDURE — 99213 OFFICE O/P EST LOW 20 MIN: CPT

## 2021-01-22 PROCEDURE — 67028 INJECTION EYE DRUG: CPT

## 2021-01-22 PROCEDURE — 92134 CPTRZ OPH DX IMG PST SGM RTA: CPT

## 2021-01-22 ASSESSMENT — TONOMETRY
OD_IOP_MMHG: 12
OS_IOP_MMHG: 22

## 2021-01-22 ASSESSMENT — VISUAL ACUITY
OD_CC: 20/200
OS_CC: 20/70

## 2021-02-26 ENCOUNTER — ESTABLISHED PATIENT (OUTPATIENT)
Dept: URBAN - METROPOLITAN AREA CLINIC 43 | Facility: CLINIC | Age: 86
End: 2021-02-26

## 2021-02-26 DIAGNOSIS — H35.3221: ICD-10-CM

## 2021-02-26 DIAGNOSIS — H35.3212: ICD-10-CM

## 2021-02-26 DIAGNOSIS — H35.363: ICD-10-CM

## 2021-02-26 DIAGNOSIS — H43.813: ICD-10-CM

## 2021-02-26 DIAGNOSIS — H35.733: ICD-10-CM

## 2021-02-26 DIAGNOSIS — H34.8121: ICD-10-CM

## 2021-02-26 DIAGNOSIS — H35.30: ICD-10-CM

## 2021-02-26 PROCEDURE — 92273 FULL FIELD ERG W/I&R: CPT

## 2021-02-26 PROCEDURE — 92250 FUNDUS PHOTOGRAPHY W/I&R: CPT

## 2021-02-26 PROCEDURE — 67028 INJECTION EYE DRUG: CPT

## 2021-02-26 PROCEDURE — 99213 OFFICE O/P EST LOW 20 MIN: CPT

## 2021-02-26 RX ORDER — TIMOLOL MALEATE 6.8 MG/ML
1 SOLUTION OPHTHALMIC TWICE A DAY
Start: 2021-02-26

## 2021-02-26 ASSESSMENT — VISUAL ACUITY
OS_CC: 20/70-2
OD_CC: CF 5FT

## 2021-02-26 ASSESSMENT — TONOMETRY
OD_IOP_MMHG: 11
OS_IOP_MMHG: 27

## 2021-04-09 ENCOUNTER — ESTABLISHED PATIENT (OUTPATIENT)
Dept: URBAN - METROPOLITAN AREA CLINIC 43 | Facility: CLINIC | Age: 86
End: 2021-04-09

## 2021-04-09 DIAGNOSIS — H35.3212: ICD-10-CM

## 2021-04-09 DIAGNOSIS — H35.363: ICD-10-CM

## 2021-04-09 DIAGNOSIS — H35.3221: ICD-10-CM

## 2021-04-09 DIAGNOSIS — H34.8121: ICD-10-CM

## 2021-04-09 DIAGNOSIS — H35.733: ICD-10-CM

## 2021-04-09 DIAGNOSIS — H43.813: ICD-10-CM

## 2021-04-09 PROCEDURE — 99213 OFFICE O/P EST LOW 20 MIN: CPT

## 2021-04-09 PROCEDURE — 92202 OPSCPY EXTND ON/MAC DRAW: CPT

## 2021-04-09 PROCEDURE — 92134 CPTRZ OPH DX IMG PST SGM RTA: CPT

## 2021-04-09 PROCEDURE — 67028 INJECTION EYE DRUG: CPT

## 2021-04-09 ASSESSMENT — VISUAL ACUITY
OD_CC: CF 2FT
OS_CC: 20/60+2

## 2021-04-09 ASSESSMENT — TONOMETRY
OS_IOP_MMHG: 17
OD_IOP_MMHG: 15

## 2021-05-07 ENCOUNTER — ESTABLISHED PATIENT (OUTPATIENT)
Dept: URBAN - METROPOLITAN AREA CLINIC 43 | Facility: CLINIC | Age: 86
End: 2021-05-07

## 2021-05-07 DIAGNOSIS — H34.8121: ICD-10-CM

## 2021-05-07 DIAGNOSIS — H35.363: ICD-10-CM

## 2021-05-07 DIAGNOSIS — H43.813: ICD-10-CM

## 2021-05-07 DIAGNOSIS — H35.733: ICD-10-CM

## 2021-05-07 DIAGNOSIS — H35.3212: ICD-10-CM

## 2021-05-07 DIAGNOSIS — H35.3221: ICD-10-CM

## 2021-05-07 PROCEDURE — 67028 INJECTION EYE DRUG: CPT

## 2021-05-07 PROCEDURE — 92273 FULL FIELD ERG W/I&R: CPT

## 2021-05-07 PROCEDURE — 92250 FUNDUS PHOTOGRAPHY W/I&R: CPT

## 2021-05-07 PROCEDURE — 99213 OFFICE O/P EST LOW 20 MIN: CPT

## 2021-05-07 ASSESSMENT — VISUAL ACUITY
OD_CC: 20/200
OS_CC: 20/50-1

## 2021-05-07 ASSESSMENT — TONOMETRY
OD_IOP_MMHG: 10
OS_IOP_MMHG: 13

## 2021-06-04 ENCOUNTER — ESTABLISHED PATIENT (OUTPATIENT)
Dept: URBAN - METROPOLITAN AREA CLINIC 43 | Facility: CLINIC | Age: 86
End: 2021-06-04

## 2021-06-04 DIAGNOSIS — H31.013: ICD-10-CM

## 2021-06-04 DIAGNOSIS — H34.8121: ICD-10-CM

## 2021-06-04 DIAGNOSIS — H35.733: ICD-10-CM

## 2021-06-04 DIAGNOSIS — H35.3212: ICD-10-CM

## 2021-06-04 DIAGNOSIS — H35.3221: ICD-10-CM

## 2021-06-04 DIAGNOSIS — H43.813: ICD-10-CM

## 2021-06-04 DIAGNOSIS — H35.363: ICD-10-CM

## 2021-06-04 DIAGNOSIS — H21.1X2: ICD-10-CM

## 2021-06-04 PROCEDURE — 99213 OFFICE O/P EST LOW 20 MIN: CPT

## 2021-06-04 PROCEDURE — 67028 INJECTION EYE DRUG: CPT

## 2021-06-04 PROCEDURE — 92134 CPTRZ OPH DX IMG PST SGM RTA: CPT

## 2021-06-04 PROCEDURE — 92202 OPSCPY EXTND ON/MAC DRAW: CPT

## 2021-06-04 ASSESSMENT — VISUAL ACUITY
OS_CC: 20/50-1
OD_CC: 20/400

## 2021-06-04 ASSESSMENT — TONOMETRY
OS_IOP_MMHG: 16
OD_IOP_MMHG: 19

## 2021-07-16 ENCOUNTER — ESTABLISHED PATIENT (OUTPATIENT)
Dept: URBAN - METROPOLITAN AREA CLINIC 43 | Facility: CLINIC | Age: 86
End: 2021-07-16

## 2021-07-16 DIAGNOSIS — H35.733: ICD-10-CM

## 2021-07-16 DIAGNOSIS — H35.30: ICD-10-CM

## 2021-07-16 DIAGNOSIS — H21.1X2: ICD-10-CM

## 2021-07-16 DIAGNOSIS — H40.052: ICD-10-CM

## 2021-07-16 DIAGNOSIS — H34.8121: ICD-10-CM

## 2021-07-16 DIAGNOSIS — H35.363: ICD-10-CM

## 2021-07-16 DIAGNOSIS — H43.813: ICD-10-CM

## 2021-07-16 DIAGNOSIS — H40.1122: ICD-10-CM

## 2021-07-16 DIAGNOSIS — H35.3221: ICD-10-CM

## 2021-07-16 DIAGNOSIS — H35.3212: ICD-10-CM

## 2021-07-16 DIAGNOSIS — H31.013: ICD-10-CM

## 2021-07-16 PROCEDURE — 67028 INJECTION EYE DRUG: CPT

## 2021-07-16 PROCEDURE — 92250 FUNDUS PHOTOGRAPHY W/I&R: CPT

## 2021-07-16 PROCEDURE — 99213 OFFICE O/P EST LOW 20 MIN: CPT

## 2021-07-16 ASSESSMENT — VISUAL ACUITY
OD_CC: CF 2FT
OS_PH: 20/40-2
OS_CC: 20/50-2

## 2021-07-16 ASSESSMENT — TONOMETRY
OD_IOP_MMHG: 12
OS_IOP_MMHG: 16

## 2021-08-18 ENCOUNTER — ESTABLISHED PATIENT (OUTPATIENT)
Dept: URBAN - METROPOLITAN AREA CLINIC 43 | Facility: CLINIC | Age: 86
End: 2021-08-18

## 2021-08-18 DIAGNOSIS — H35.3221: ICD-10-CM

## 2021-08-18 DIAGNOSIS — H35.363: ICD-10-CM

## 2021-08-18 DIAGNOSIS — H43.813: ICD-10-CM

## 2021-08-18 DIAGNOSIS — H31.013: ICD-10-CM

## 2021-08-18 DIAGNOSIS — H35.733: ICD-10-CM

## 2021-08-18 DIAGNOSIS — H40.1122: ICD-10-CM

## 2021-08-18 DIAGNOSIS — H35.3212: ICD-10-CM

## 2021-08-18 PROCEDURE — 99213 OFFICE O/P EST LOW 20 MIN: CPT

## 2021-08-18 PROCEDURE — 67028 INJECTION EYE DRUG: CPT

## 2021-08-18 PROCEDURE — 92202 OPSCPY EXTND ON/MAC DRAW: CPT

## 2021-08-18 PROCEDURE — 92134 CPTRZ OPH DX IMG PST SGM RTA: CPT

## 2021-08-18 ASSESSMENT — VISUAL ACUITY
OS_CC: 20/40-1
OD_CC: CF 2FT

## 2021-08-18 ASSESSMENT — TONOMETRY
OD_IOP_MMHG: 12
OS_IOP_MMHG: 12

## 2021-09-27 ENCOUNTER — ESTABLISHED PATIENT (OUTPATIENT)
Dept: URBAN - METROPOLITAN AREA CLINIC 43 | Facility: CLINIC | Age: 86
End: 2021-09-27

## 2021-09-27 DIAGNOSIS — H40.89: ICD-10-CM

## 2021-09-27 DIAGNOSIS — H31.013: ICD-10-CM

## 2021-09-27 DIAGNOSIS — H35.3221: ICD-10-CM

## 2021-09-27 DIAGNOSIS — H35.733: ICD-10-CM

## 2021-09-27 DIAGNOSIS — H35.3212: ICD-10-CM

## 2021-09-27 DIAGNOSIS — H43.813: ICD-10-CM

## 2021-09-27 DIAGNOSIS — H34.8121: ICD-10-CM

## 2021-09-27 DIAGNOSIS — H35.363: ICD-10-CM

## 2021-09-27 DIAGNOSIS — H40.1122: ICD-10-CM

## 2021-09-27 PROCEDURE — 92273 FULL FIELD ERG W/I&R: CPT

## 2021-09-27 PROCEDURE — 67028 INJECTION EYE DRUG: CPT

## 2021-09-27 PROCEDURE — 99213 OFFICE O/P EST LOW 20 MIN: CPT

## 2021-09-27 PROCEDURE — 92250 FUNDUS PHOTOGRAPHY W/I&R: CPT

## 2021-09-27 ASSESSMENT — TONOMETRY
OD_IOP_MMHG: 13
OS_IOP_MMHG: 15

## 2021-09-27 ASSESSMENT — VISUAL ACUITY
OD_CC: CF 2FT
OS_PH: 20/60++
OS_CC: 20/60+

## 2021-11-08 ENCOUNTER — ESTABLISHED PATIENT (OUTPATIENT)
Dept: URBAN - METROPOLITAN AREA CLINIC 43 | Facility: CLINIC | Age: 86
End: 2021-11-08

## 2021-11-08 DIAGNOSIS — H43.813: ICD-10-CM

## 2021-11-08 DIAGNOSIS — H40.1122: ICD-10-CM

## 2021-11-08 DIAGNOSIS — H40.89: ICD-10-CM

## 2021-11-08 DIAGNOSIS — H35.363: ICD-10-CM

## 2021-11-08 DIAGNOSIS — H35.3221: ICD-10-CM

## 2021-11-08 DIAGNOSIS — H35.30: ICD-10-CM

## 2021-11-08 DIAGNOSIS — H31.013: ICD-10-CM

## 2021-11-08 DIAGNOSIS — H35.3212: ICD-10-CM

## 2021-11-08 DIAGNOSIS — H35.733: ICD-10-CM

## 2021-11-08 PROCEDURE — 92134 CPTRZ OPH DX IMG PST SGM RTA: CPT

## 2021-11-08 PROCEDURE — 67028 INJECTION EYE DRUG: CPT

## 2021-11-08 PROCEDURE — 99213 OFFICE O/P EST LOW 20 MIN: CPT

## 2021-11-08 ASSESSMENT — VISUAL ACUITY
OD_CC: CF 3FT
OS_CC: 20/60

## 2021-11-08 ASSESSMENT — TONOMETRY
OD_IOP_MMHG: 12
OS_IOP_MMHG: 14

## 2021-12-17 ENCOUNTER — ESTABLISHED PATIENT (OUTPATIENT)
Dept: URBAN - METROPOLITAN AREA CLINIC 43 | Facility: CLINIC | Age: 86
End: 2021-12-17

## 2021-12-17 DIAGNOSIS — H43.813: ICD-10-CM

## 2021-12-17 DIAGNOSIS — H35.733: ICD-10-CM

## 2021-12-17 DIAGNOSIS — H35.3212: ICD-10-CM

## 2021-12-17 DIAGNOSIS — H31.013: ICD-10-CM

## 2021-12-17 DIAGNOSIS — H35.363: ICD-10-CM

## 2021-12-17 DIAGNOSIS — H35.3221: ICD-10-CM

## 2021-12-17 DIAGNOSIS — H40.89: ICD-10-CM

## 2021-12-17 DIAGNOSIS — H34.8121: ICD-10-CM

## 2021-12-17 PROCEDURE — 99213 OFFICE O/P EST LOW 20 MIN: CPT

## 2021-12-17 PROCEDURE — 92134 CPTRZ OPH DX IMG PST SGM RTA: CPT

## 2021-12-17 PROCEDURE — 67028 INJECTION EYE DRUG: CPT

## 2021-12-17 PROCEDURE — 92273 FULL FIELD ERG W/I&R: CPT

## 2021-12-17 ASSESSMENT — VISUAL ACUITY
OD_CC: CF 6FT
OS_CC: 20/40-1

## 2021-12-17 ASSESSMENT — TONOMETRY
OS_IOP_MMHG: 13
OD_IOP_MMHG: 11

## 2022-01-21 ENCOUNTER — ESTABLISHED PATIENT (OUTPATIENT)
Dept: URBAN - METROPOLITAN AREA CLINIC 43 | Facility: CLINIC | Age: 87
End: 2022-01-21

## 2022-01-21 DIAGNOSIS — H35.733: ICD-10-CM

## 2022-01-21 DIAGNOSIS — H40.89: ICD-10-CM

## 2022-01-21 DIAGNOSIS — H34.8121: ICD-10-CM

## 2022-01-21 DIAGNOSIS — H35.363: ICD-10-CM

## 2022-01-21 DIAGNOSIS — H43.813: ICD-10-CM

## 2022-01-21 DIAGNOSIS — H35.3212: ICD-10-CM

## 2022-01-21 DIAGNOSIS — H35.3221: ICD-10-CM

## 2022-01-21 PROCEDURE — 67028 INJECTION EYE DRUG: CPT

## 2022-01-21 PROCEDURE — 92250 FUNDUS PHOTOGRAPHY W/I&R: CPT

## 2022-01-21 PROCEDURE — 99213 OFFICE O/P EST LOW 20 MIN: CPT

## 2022-01-21 ASSESSMENT — TONOMETRY
OD_IOP_MMHG: 12
OS_IOP_MMHG: 13

## 2022-01-21 ASSESSMENT — VISUAL ACUITY
OS_CC: 20/50-2
OD_CC: CF 2FT

## 2022-03-04 ENCOUNTER — CLINIC PROCEDURE ONLY (OUTPATIENT)
Dept: URBAN - METROPOLITAN AREA CLINIC 43 | Facility: CLINIC | Age: 87
End: 2022-03-04

## 2022-03-04 DIAGNOSIS — H35.3221: ICD-10-CM

## 2022-03-04 PROCEDURE — 92250 FUNDUS PHOTOGRAPHY W/I&R: CPT

## 2022-03-04 PROCEDURE — 67028 INJECTION EYE DRUG: CPT

## 2022-03-04 ASSESSMENT — VISUAL ACUITY
OD_SC: CF 2FT
OS_SC: 20/200

## 2022-03-04 ASSESSMENT — TONOMETRY
OD_IOP_MMHG: 13
OS_IOP_MMHG: 16

## 2022-04-08 ENCOUNTER — CLINIC PROCEDURE ONLY (OUTPATIENT)
Dept: URBAN - METROPOLITAN AREA CLINIC 43 | Facility: CLINIC | Age: 87
End: 2022-04-08

## 2022-04-08 DIAGNOSIS — H35.733: ICD-10-CM

## 2022-04-08 DIAGNOSIS — H35.3221: ICD-10-CM

## 2022-04-08 PROCEDURE — 67028 INJECTION EYE DRUG: CPT

## 2022-04-08 PROCEDURE — 92250 FUNDUS PHOTOGRAPHY W/I&R: CPT

## 2022-04-08 ASSESSMENT — TONOMETRY
OS_IOP_MMHG: 17
OD_IOP_MMHG: 13

## 2022-04-08 ASSESSMENT — VISUAL ACUITY
OS_CC: 20/40-1
OD_CC: CF 2FT

## 2022-05-20 ENCOUNTER — ESTABLISHED PATIENT (OUTPATIENT)
Dept: URBAN - METROPOLITAN AREA CLINIC 43 | Facility: CLINIC | Age: 87
End: 2022-05-20

## 2022-05-20 DIAGNOSIS — H35.3221: ICD-10-CM

## 2022-05-20 DIAGNOSIS — H31.013: ICD-10-CM

## 2022-05-20 DIAGNOSIS — H43.813: ICD-10-CM

## 2022-05-20 DIAGNOSIS — H34.8121: ICD-10-CM

## 2022-05-20 DIAGNOSIS — H40.052: ICD-10-CM

## 2022-05-20 DIAGNOSIS — H35.733: ICD-10-CM

## 2022-05-20 DIAGNOSIS — H35.363: ICD-10-CM

## 2022-05-20 PROCEDURE — 92250 FUNDUS PHOTOGRAPHY W/I&R: CPT

## 2022-05-20 PROCEDURE — 67028 INJECTION EYE DRUG: CPT

## 2022-05-20 PROCEDURE — 99213 OFFICE O/P EST LOW 20 MIN: CPT

## 2022-05-20 ASSESSMENT — VISUAL ACUITY
OS_CC: 20/60+1
OD_CC: CF 2FT

## 2022-05-20 ASSESSMENT — TONOMETRY
OS_IOP_MMHG: 17
OD_IOP_MMHG: 14

## 2022-07-25 ENCOUNTER — COMPREHENSIVE EXAM (OUTPATIENT)
Dept: URBAN - METROPOLITAN AREA CLINIC 43 | Facility: CLINIC | Age: 87
End: 2022-07-25

## 2022-08-05 ENCOUNTER — CLINIC PROCEDURE ONLY (OUTPATIENT)
Dept: URBAN - METROPOLITAN AREA CLINIC 43 | Facility: CLINIC | Age: 87
End: 2022-08-05

## 2022-08-05 DIAGNOSIS — H35.733: ICD-10-CM

## 2022-08-05 DIAGNOSIS — H35.3221: ICD-10-CM

## 2022-08-05 DIAGNOSIS — H34.8121: ICD-10-CM

## 2022-08-05 PROCEDURE — 92250 FUNDUS PHOTOGRAPHY W/I&R: CPT

## 2022-08-05 PROCEDURE — 92273 FULL FIELD ERG W/I&R: CPT

## 2022-08-05 PROCEDURE — 67028 INJECTION EYE DRUG: CPT

## 2022-08-05 ASSESSMENT — VISUAL ACUITY
OD_CC: CF 1FT
OS_CC: 20/50-1

## 2022-08-05 ASSESSMENT — TONOMETRY
OD_IOP_MMHG: 17
OS_IOP_MMHG: 17

## 2022-08-24 ENCOUNTER — COMPREHENSIVE EXAM (OUTPATIENT)
Dept: URBAN - METROPOLITAN AREA CLINIC 43 | Facility: CLINIC | Age: 87
End: 2022-08-24

## 2022-08-24 DIAGNOSIS — H35.3221: ICD-10-CM

## 2022-08-24 DIAGNOSIS — Z96.1: ICD-10-CM

## 2022-08-24 DIAGNOSIS — H35.733: ICD-10-CM

## 2022-08-24 DIAGNOSIS — H40.052: ICD-10-CM

## 2022-08-24 DIAGNOSIS — H40.1122: ICD-10-CM

## 2022-08-24 DIAGNOSIS — H43.813: ICD-10-CM

## 2022-08-24 PROCEDURE — 92015 DETERMINE REFRACTIVE STATE: CPT

## 2022-08-24 PROCEDURE — 92014 COMPRE OPH EXAM EST PT 1/>: CPT

## 2022-08-24 PROCEDURE — 92133 CPTRZD OPH DX IMG PST SGM ON: CPT

## 2022-08-24 ASSESSMENT — VISUAL ACUITY
OS_CC: 20/50-2
OS_SC: 20/100
OS_CC: J4-
OS_SC: J8
OD_SC: <J12
OD_CC: <J12
OD_CC: CF 2FT
OD_SC: CF 2FT

## 2022-08-24 ASSESSMENT — TONOMETRY
OS_IOP_MMHG: 14
OD_IOP_MMHG: 10

## 2022-09-13 ENCOUNTER — ESTABLISHED PATIENT (OUTPATIENT)
Dept: URBAN - METROPOLITAN AREA CLINIC 43 | Facility: CLINIC | Age: 87
End: 2022-09-13

## 2022-09-13 DIAGNOSIS — H40.1122: ICD-10-CM

## 2022-09-13 DIAGNOSIS — H35.3221: ICD-10-CM

## 2022-09-13 DIAGNOSIS — H43.813: ICD-10-CM

## 2022-09-13 DIAGNOSIS — H35.733: ICD-10-CM

## 2022-09-13 DIAGNOSIS — H40.052: ICD-10-CM

## 2022-09-13 PROCEDURE — 92012 INTRM OPH EXAM EST PATIENT: CPT

## 2022-09-13 PROCEDURE — 92083 EXTENDED VISUAL FIELD XM: CPT

## 2022-09-13 ASSESSMENT — TONOMETRY
OD_IOP_MMHG: 14
OS_IOP_MMHG: 17

## 2022-09-13 ASSESSMENT — VISUAL ACUITY
OS_CC: 20/40
OD_CC: CF 3FT

## 2022-10-07 ENCOUNTER — ESTABLISHED PATIENT (OUTPATIENT)
Dept: URBAN - METROPOLITAN AREA CLINIC 39 | Facility: CLINIC | Age: 87
End: 2022-10-07

## 2022-10-07 DIAGNOSIS — H35.3221: ICD-10-CM

## 2022-10-07 DIAGNOSIS — H40.1122: ICD-10-CM

## 2022-10-07 DIAGNOSIS — H43.813: ICD-10-CM

## 2022-10-07 DIAGNOSIS — H31.013: ICD-10-CM

## 2022-10-07 DIAGNOSIS — H40.052: ICD-10-CM

## 2022-10-07 DIAGNOSIS — H35.733: ICD-10-CM

## 2022-10-07 DIAGNOSIS — H35.3212: ICD-10-CM

## 2022-10-07 DIAGNOSIS — H35.363: ICD-10-CM

## 2022-10-07 DIAGNOSIS — H34.8121: ICD-10-CM

## 2022-10-07 PROCEDURE — 67028 INJECTION EYE DRUG: CPT

## 2022-10-07 PROCEDURE — 92250 FUNDUS PHOTOGRAPHY W/I&R: CPT

## 2022-10-07 PROCEDURE — 99214 OFFICE O/P EST MOD 30 MIN: CPT

## 2022-10-07 ASSESSMENT — TONOMETRY
OS_IOP_MMHG: 11
OD_IOP_MMHG: 14

## 2022-10-07 ASSESSMENT — VISUAL ACUITY
OU_SC: 20/100-1
OD_SC: CF 4FT
OS_SC: 20/100-1

## 2022-11-18 ENCOUNTER — CLINIC PROCEDURE ONLY (OUTPATIENT)
Dept: URBAN - METROPOLITAN AREA CLINIC 43 | Facility: CLINIC | Age: 87
End: 2022-11-18

## 2022-11-18 DIAGNOSIS — H35.733: ICD-10-CM

## 2022-11-18 DIAGNOSIS — H35.3221: ICD-10-CM

## 2022-11-18 PROCEDURE — 67028 INJECTION EYE DRUG: CPT

## 2022-11-18 PROCEDURE — 92250 FUNDUS PHOTOGRAPHY W/I&R: CPT

## 2022-11-18 ASSESSMENT — TONOMETRY
OS_IOP_MMHG: 19
OD_IOP_MMHG: 16

## 2022-11-18 ASSESSMENT — VISUAL ACUITY
OS_CC: 20/40-1
OD_CC: CF 3FT

## 2023-01-06 ENCOUNTER — ESTABLISHED PATIENT (OUTPATIENT)
Dept: URBAN - METROPOLITAN AREA CLINIC 43 | Facility: CLINIC | Age: 88
End: 2023-01-06

## 2023-01-06 DIAGNOSIS — H40.1122: ICD-10-CM

## 2023-01-06 DIAGNOSIS — H35.733: ICD-10-CM

## 2023-01-06 DIAGNOSIS — H43.813: ICD-10-CM

## 2023-01-06 DIAGNOSIS — H35.3221: ICD-10-CM

## 2023-01-06 DIAGNOSIS — H34.8121: ICD-10-CM

## 2023-01-06 DIAGNOSIS — H35.30: ICD-10-CM

## 2023-01-06 DIAGNOSIS — H40.052: ICD-10-CM

## 2023-01-06 DIAGNOSIS — H35.3212: ICD-10-CM

## 2023-01-06 DIAGNOSIS — H31.013: ICD-10-CM

## 2023-01-06 DIAGNOSIS — H35.363: ICD-10-CM

## 2023-01-06 PROCEDURE — 92250 FUNDUS PHOTOGRAPHY W/I&R: CPT

## 2023-01-06 PROCEDURE — 99214 OFFICE O/P EST MOD 30 MIN: CPT

## 2023-01-06 PROCEDURE — 67028 INJECTION EYE DRUG: CPT

## 2023-01-06 ASSESSMENT — VISUAL ACUITY
OD_CC: CF 2FT
OS_CC: 20/40+1

## 2023-01-06 ASSESSMENT — TONOMETRY
OD_IOP_MMHG: 15
OS_IOP_MMHG: 16

## 2023-04-07 ENCOUNTER — ESTABLISHED PATIENT (OUTPATIENT)
Dept: URBAN - METROPOLITAN AREA CLINIC 43 | Facility: CLINIC | Age: 88
End: 2023-04-07

## 2023-04-07 DIAGNOSIS — H43.813: ICD-10-CM

## 2023-04-07 DIAGNOSIS — H43.11: ICD-10-CM

## 2023-04-07 DIAGNOSIS — H31.013: ICD-10-CM

## 2023-04-07 DIAGNOSIS — H35.363: ICD-10-CM

## 2023-04-07 DIAGNOSIS — S05.11XA: ICD-10-CM

## 2023-04-07 DIAGNOSIS — H35.61: ICD-10-CM

## 2023-04-07 DIAGNOSIS — H34.8121: ICD-10-CM

## 2023-04-07 DIAGNOSIS — H35.30: ICD-10-CM

## 2023-04-07 DIAGNOSIS — H35.733: ICD-10-CM

## 2023-04-07 DIAGNOSIS — H35.3212: ICD-10-CM

## 2023-04-07 DIAGNOSIS — H35.3221: ICD-10-CM

## 2023-04-07 PROCEDURE — 99214 OFFICE O/P EST MOD 30 MIN: CPT

## 2023-04-07 PROCEDURE — 67028 INJECTION EYE DRUG: CPT

## 2023-04-07 PROCEDURE — 92250 FUNDUS PHOTOGRAPHY W/I&R: CPT

## 2023-04-07 ASSESSMENT — TONOMETRY
OS_IOP_MMHG: 17
OD_IOP_MMHG: 14

## 2023-04-07 ASSESSMENT — VISUAL ACUITY: OS_CC: 20/40-1

## 2023-06-02 ENCOUNTER — CLINIC PROCEDURE ONLY (OUTPATIENT)
Dept: URBAN - METROPOLITAN AREA CLINIC 43 | Facility: CLINIC | Age: 88
End: 2023-06-02

## 2023-06-02 DIAGNOSIS — H35.3212: ICD-10-CM

## 2023-06-02 DIAGNOSIS — H35.3221: ICD-10-CM

## 2023-06-02 DIAGNOSIS — H35.733: ICD-10-CM

## 2023-06-02 DIAGNOSIS — H31.013: ICD-10-CM

## 2023-06-02 PROCEDURE — 92250 FUNDUS PHOTOGRAPHY W/I&R: CPT

## 2023-06-02 PROCEDURE — 67028 INJECTION EYE DRUG: CPT

## 2023-06-02 PROCEDURE — 92273 FULL FIELD ERG W/I&R: CPT

## 2023-06-02 ASSESSMENT — TONOMETRY
OS_IOP_MMHG: 14
OD_IOP_MMHG: 12

## 2023-06-02 ASSESSMENT — VISUAL ACUITY: OS_CC: 20/30-1

## 2023-07-14 ENCOUNTER — ESTABLISHED PATIENT (OUTPATIENT)
Dept: URBAN - METROPOLITAN AREA CLINIC 43 | Facility: CLINIC | Age: 88
End: 2023-07-14

## 2023-07-14 DIAGNOSIS — H35.733: ICD-10-CM

## 2023-07-14 DIAGNOSIS — H43.813: ICD-10-CM

## 2023-07-14 DIAGNOSIS — H35.30: ICD-10-CM

## 2023-07-14 DIAGNOSIS — H31.013: ICD-10-CM

## 2023-07-14 DIAGNOSIS — H35.363: ICD-10-CM

## 2023-07-14 DIAGNOSIS — H35.3212: ICD-10-CM

## 2023-07-14 DIAGNOSIS — H35.3221: ICD-10-CM

## 2023-07-14 PROCEDURE — 67028 INJECTION EYE DRUG: CPT

## 2023-07-14 PROCEDURE — 92250 FUNDUS PHOTOGRAPHY W/I&R: CPT

## 2023-07-14 PROCEDURE — 99213 OFFICE O/P EST LOW 20 MIN: CPT

## 2023-07-14 ASSESSMENT — TONOMETRY
OS_IOP_MMHG: 13
OD_IOP_MMHG: 11

## 2023-07-14 ASSESSMENT — VISUAL ACUITY
OS_SC: 20/40+1
OD_SC: CF 2FT

## 2023-09-08 ENCOUNTER — CLINIC PROCEDURE ONLY (OUTPATIENT)
Dept: URBAN - METROPOLITAN AREA CLINIC 43 | Facility: CLINIC | Age: 88
End: 2023-09-08

## 2023-09-08 DIAGNOSIS — H35.3221: ICD-10-CM

## 2023-09-08 DIAGNOSIS — H35.733: ICD-10-CM

## 2023-09-08 DIAGNOSIS — H34.8121: ICD-10-CM

## 2023-09-08 PROCEDURE — 92273 FULL FIELD ERG W/I&R: CPT

## 2023-09-08 PROCEDURE — 67028 INJECTION EYE DRUG: CPT

## 2023-09-08 PROCEDURE — 92250 FUNDUS PHOTOGRAPHY W/I&R: CPT

## 2023-09-08 ASSESSMENT — TONOMETRY
OD_IOP_MMHG: 12
OS_IOP_MMHG: 12

## 2023-09-08 ASSESSMENT — VISUAL ACUITY
OS_SC: 20/40
OD_SC: CF 2FT

## 2023-11-06 ENCOUNTER — ESTABLISHED PATIENT (OUTPATIENT)
Dept: URBAN - METROPOLITAN AREA CLINIC 43 | Facility: CLINIC | Age: 88
End: 2023-11-06

## 2023-11-06 DIAGNOSIS — H35.363: ICD-10-CM

## 2023-11-06 DIAGNOSIS — H35.733: ICD-10-CM

## 2023-11-06 DIAGNOSIS — H43.813: ICD-10-CM

## 2023-11-06 DIAGNOSIS — H35.3221: ICD-10-CM

## 2023-11-06 DIAGNOSIS — H35.3212: ICD-10-CM

## 2023-11-06 DIAGNOSIS — H35.30: ICD-10-CM

## 2023-11-06 DIAGNOSIS — H31.013: ICD-10-CM

## 2023-11-06 PROCEDURE — 99214 OFFICE O/P EST MOD 30 MIN: CPT

## 2023-11-06 PROCEDURE — 92134 CPTRZ OPH DX IMG PST SGM RTA: CPT

## 2023-11-06 PROCEDURE — 92250 FUNDUS PHOTOGRAPHY W/I&R: CPT

## 2023-11-06 PROCEDURE — 67028 INJECTION EYE DRUG: CPT

## 2023-11-06 ASSESSMENT — VISUAL ACUITY
OS_CC: 20/40-1
OD_CC: CF 2FT

## 2023-11-06 ASSESSMENT — TONOMETRY
OD_IOP_MMHG: 12
OS_IOP_MMHG: 14

## 2024-01-03 ENCOUNTER — ESTABLISHED PATIENT (OUTPATIENT)
Dept: URBAN - METROPOLITAN AREA CLINIC 43 | Facility: CLINIC | Age: 89
End: 2024-01-03

## 2024-01-03 DIAGNOSIS — H35.363: ICD-10-CM

## 2024-01-03 DIAGNOSIS — H31.013: ICD-10-CM

## 2024-01-03 DIAGNOSIS — H34.8121: ICD-10-CM

## 2024-01-03 DIAGNOSIS — H35.30: ICD-10-CM

## 2024-01-03 DIAGNOSIS — H35.61: ICD-10-CM

## 2024-01-03 DIAGNOSIS — H35.733: ICD-10-CM

## 2024-01-03 DIAGNOSIS — H35.3221: ICD-10-CM

## 2024-01-03 DIAGNOSIS — H43.813: ICD-10-CM

## 2024-01-03 DIAGNOSIS — H43.11: ICD-10-CM

## 2024-01-03 DIAGNOSIS — H40.1122: ICD-10-CM

## 2024-01-03 DIAGNOSIS — H35.3212: ICD-10-CM

## 2024-01-03 PROCEDURE — 67028 INJECTION EYE DRUG: CPT

## 2024-01-03 PROCEDURE — 92250 FUNDUS PHOTOGRAPHY W/I&R: CPT

## 2024-01-03 PROCEDURE — 99214 OFFICE O/P EST MOD 30 MIN: CPT

## 2024-01-03 ASSESSMENT — VISUAL ACUITY
OS_CC: 20/30-1
OD_CC: CF 1FT

## 2024-01-03 ASSESSMENT — TONOMETRY
OS_IOP_MMHG: 12
OD_IOP_MMHG: 14

## 2024-02-23 ENCOUNTER — CLINIC PROCEDURE ONLY (OUTPATIENT)
Dept: URBAN - METROPOLITAN AREA CLINIC 43 | Facility: CLINIC | Age: 89
End: 2024-02-23

## 2024-02-23 DIAGNOSIS — H35.3221: ICD-10-CM

## 2024-02-23 DIAGNOSIS — H34.8121: ICD-10-CM

## 2024-02-23 PROCEDURE — 92273 FULL FIELD ERG W/I&R: CPT

## 2024-02-23 PROCEDURE — 92250 FUNDUS PHOTOGRAPHY W/I&R: CPT

## 2024-02-23 PROCEDURE — 67028 INJECTION EYE DRUG: CPT

## 2024-02-23 ASSESSMENT — VISUAL ACUITY
OD_CC: CF 2FT
OS_CC: 20/30

## 2024-02-23 ASSESSMENT — TONOMETRY
OD_IOP_MMHG: 10
OS_IOP_MMHG: 11

## 2024-04-24 ENCOUNTER — ESTABLISHED PATIENT (OUTPATIENT)
Dept: URBAN - METROPOLITAN AREA CLINIC 43 | Facility: CLINIC | Age: 89
End: 2024-04-24

## 2024-04-24 DIAGNOSIS — H35.30: ICD-10-CM

## 2024-04-24 DIAGNOSIS — H04.123: ICD-10-CM

## 2024-04-24 DIAGNOSIS — H35.3212: ICD-10-CM

## 2024-04-24 DIAGNOSIS — H34.8121: ICD-10-CM

## 2024-04-24 DIAGNOSIS — H43.813: ICD-10-CM

## 2024-04-24 DIAGNOSIS — H35.733: ICD-10-CM

## 2024-04-24 DIAGNOSIS — H35.3221: ICD-10-CM

## 2024-04-24 DIAGNOSIS — H31.013: ICD-10-CM

## 2024-04-24 DIAGNOSIS — H35.363: ICD-10-CM

## 2024-04-24 PROCEDURE — 99213 OFFICE O/P EST LOW 20 MIN: CPT | Mod: 25

## 2024-04-24 PROCEDURE — 92250 FUNDUS PHOTOGRAPHY W/I&R: CPT

## 2024-04-24 PROCEDURE — 67028 INJECTION EYE DRUG: CPT

## 2024-04-24 ASSESSMENT — VISUAL ACUITY
OS_SC: 20/30-2
OD_SC: CF 2FT

## 2024-04-24 ASSESSMENT — TONOMETRY
OD_IOP_MMHG: 11
OS_IOP_MMHG: 12

## 2024-06-05 ENCOUNTER — CLINIC PROCEDURE ONLY (OUTPATIENT)
Dept: URBAN - METROPOLITAN AREA CLINIC 43 | Facility: CLINIC | Age: 89
End: 2024-06-05

## 2024-06-05 DIAGNOSIS — H35.733: ICD-10-CM

## 2024-06-05 DIAGNOSIS — H35.3221: ICD-10-CM

## 2024-06-05 DIAGNOSIS — H34.8121: ICD-10-CM

## 2024-06-05 PROCEDURE — 92250 FUNDUS PHOTOGRAPHY W/I&R: CPT

## 2024-06-05 PROCEDURE — 67028 INJECTION EYE DRUG: CPT

## 2024-06-05 ASSESSMENT — TONOMETRY
OS_IOP_MMHG: 9
OD_IOP_MMHG: 8

## 2024-06-05 ASSESSMENT — VISUAL ACUITY
OD_CC: CF 2FT
OS_CC: 20/40+1

## 2024-07-17 ENCOUNTER — COMPREHENSIVE EXAM (OUTPATIENT)
Dept: URBAN - METROPOLITAN AREA CLINIC 43 | Facility: CLINIC | Age: 89
End: 2024-07-17

## 2024-07-17 DIAGNOSIS — H35.3221: ICD-10-CM

## 2024-07-17 DIAGNOSIS — H35.30: ICD-10-CM

## 2024-07-17 DIAGNOSIS — H35.363: ICD-10-CM

## 2024-07-17 DIAGNOSIS — H35.3212: ICD-10-CM

## 2024-07-17 DIAGNOSIS — H34.8121: ICD-10-CM

## 2024-07-17 DIAGNOSIS — H35.733: ICD-10-CM

## 2024-07-17 DIAGNOSIS — H40.1122: ICD-10-CM

## 2024-07-17 PROCEDURE — 92250 FUNDUS PHOTOGRAPHY W/I&R: CPT

## 2024-07-17 PROCEDURE — 99213 OFFICE O/P EST LOW 20 MIN: CPT | Mod: 25

## 2024-07-17 PROCEDURE — 92273 FULL FIELD ERG W/I&R: CPT

## 2024-07-17 PROCEDURE — 67028 INJECTION EYE DRUG: CPT

## 2024-07-17 ASSESSMENT — VISUAL ACUITY
OD_CC: CF 2FT
OS_CC: 20/30+1

## 2024-07-17 ASSESSMENT — TONOMETRY
OS_IOP_MMHG: 10
OD_IOP_MMHG: 9

## 2024-09-09 ENCOUNTER — COMPREHENSIVE EXAM (OUTPATIENT)
Dept: URBAN - METROPOLITAN AREA CLINIC 43 | Facility: CLINIC | Age: 89
End: 2024-09-09

## 2024-09-09 DIAGNOSIS — H34.8121: ICD-10-CM

## 2024-09-09 DIAGNOSIS — H43.813: ICD-10-CM

## 2024-09-09 DIAGNOSIS — H35.30: ICD-10-CM

## 2024-09-09 DIAGNOSIS — H35.363: ICD-10-CM

## 2024-09-09 DIAGNOSIS — H35.3221: ICD-10-CM

## 2024-09-09 DIAGNOSIS — H35.733: ICD-10-CM

## 2024-09-09 PROCEDURE — 92250 FUNDUS PHOTOGRAPHY W/I&R: CPT

## 2024-09-09 PROCEDURE — 99213 OFFICE O/P EST LOW 20 MIN: CPT | Mod: 25

## 2024-09-09 PROCEDURE — 67028 INJECTION EYE DRUG: CPT

## 2024-11-18 ENCOUNTER — COMPREHENSIVE EXAM (OUTPATIENT)
Dept: URBAN - METROPOLITAN AREA CLINIC 43 | Facility: CLINIC | Age: 89
End: 2024-11-18

## 2024-11-18 DIAGNOSIS — H34.8121: ICD-10-CM

## 2024-11-18 DIAGNOSIS — H35.3212: ICD-10-CM

## 2024-11-18 DIAGNOSIS — H35.363: ICD-10-CM

## 2024-11-18 DIAGNOSIS — H40.1122: ICD-10-CM

## 2024-11-18 DIAGNOSIS — H35.30: ICD-10-CM

## 2024-11-18 DIAGNOSIS — H35.733: ICD-10-CM

## 2024-11-18 DIAGNOSIS — H35.3221: ICD-10-CM

## 2024-11-18 PROCEDURE — 92250 FUNDUS PHOTOGRAPHY W/I&R: CPT

## 2024-11-18 PROCEDURE — 99213 OFFICE O/P EST LOW 20 MIN: CPT | Mod: 25

## 2024-11-18 PROCEDURE — 67028 INJECTION EYE DRUG: CPT

## 2025-01-15 ENCOUNTER — COMPREHENSIVE EXAM (OUTPATIENT)
Age: OVER 89
End: 2025-01-15

## 2025-01-15 DIAGNOSIS — H34.8121: ICD-10-CM

## 2025-01-15 DIAGNOSIS — H35.30: ICD-10-CM

## 2025-01-15 DIAGNOSIS — H35.363: ICD-10-CM

## 2025-01-15 DIAGNOSIS — H31.013: ICD-10-CM

## 2025-01-15 DIAGNOSIS — H43.813: ICD-10-CM

## 2025-01-15 DIAGNOSIS — H35.3212: ICD-10-CM

## 2025-01-15 DIAGNOSIS — H40.1122: ICD-10-CM

## 2025-01-15 DIAGNOSIS — H35.733: ICD-10-CM

## 2025-01-15 DIAGNOSIS — H35.3221: ICD-10-CM

## 2025-01-15 PROCEDURE — 67028 INJECTION EYE DRUG: CPT

## 2025-01-15 PROCEDURE — 99213 OFFICE O/P EST LOW 20 MIN: CPT | Mod: 25

## 2025-01-15 PROCEDURE — 92250 FUNDUS PHOTOGRAPHY W/I&R: CPT

## 2025-01-15 PROCEDURE — 92273 FULL FIELD ERG W/I&R: CPT

## 2025-02-26 ENCOUNTER — COMPREHENSIVE EXAM (OUTPATIENT)
Age: OVER 89
End: 2025-02-26

## 2025-02-26 DIAGNOSIS — H31.013: ICD-10-CM

## 2025-02-26 DIAGNOSIS — H40.1122: ICD-10-CM

## 2025-02-26 DIAGNOSIS — H35.3221: ICD-10-CM

## 2025-02-26 DIAGNOSIS — H34.8121: ICD-10-CM

## 2025-02-26 DIAGNOSIS — H35.363: ICD-10-CM

## 2025-02-26 DIAGNOSIS — H35.733: ICD-10-CM

## 2025-02-26 DIAGNOSIS — H35.30: ICD-10-CM

## 2025-02-26 DIAGNOSIS — H43.813: ICD-10-CM

## 2025-02-26 DIAGNOSIS — H35.3212: ICD-10-CM

## 2025-02-26 PROCEDURE — 99214 OFFICE O/P EST MOD 30 MIN: CPT | Mod: 25

## 2025-02-26 PROCEDURE — 92134 CPTRZ OPH DX IMG PST SGM RTA: CPT

## 2025-02-26 PROCEDURE — J2777PFS VABYSMO PFS: Mod: JZ,LT

## 2025-02-26 PROCEDURE — 67028 INJECTION EYE DRUG: CPT

## 2025-04-11 ENCOUNTER — CLINIC PROCEDURE ONLY (OUTPATIENT)
Age: OVER 89
End: 2025-04-11

## 2025-04-11 DIAGNOSIS — H35.733: ICD-10-CM

## 2025-04-11 DIAGNOSIS — H35.3221: ICD-10-CM

## 2025-04-11 PROCEDURE — 67028 INJECTION EYE DRUG: CPT

## 2025-04-11 PROCEDURE — 92250 FUNDUS PHOTOGRAPHY W/I&R: CPT

## 2025-04-11 PROCEDURE — J2777PFS VABYSMO PFS: Mod: JZ,LT

## 2025-06-06 ENCOUNTER — COMPREHENSIVE EXAM (OUTPATIENT)
Age: OVER 89
End: 2025-06-06

## 2025-06-06 DIAGNOSIS — H35.3212: ICD-10-CM

## 2025-06-06 DIAGNOSIS — H35.733: ICD-10-CM

## 2025-06-06 DIAGNOSIS — H34.8121: ICD-10-CM

## 2025-06-06 DIAGNOSIS — H40.1122: ICD-10-CM

## 2025-06-06 DIAGNOSIS — H35.3221: ICD-10-CM

## 2025-06-06 PROCEDURE — 99213 OFFICE O/P EST LOW 20 MIN: CPT | Mod: 25

## 2025-06-06 PROCEDURE — 67028 INJECTION EYE DRUG: CPT

## 2025-06-06 PROCEDURE — J2777PFS VABYSMO PFS: Mod: JZ,LT

## 2025-06-06 PROCEDURE — 92134 CPTRZ OPH DX IMG PST SGM RTA: CPT

## 2025-06-06 PROCEDURE — 92273 FULL FIELD ERG W/I&R: CPT

## 2025-08-04 ENCOUNTER — CLINIC PROCEDURE ONLY (OUTPATIENT)
Age: OVER 89
End: 2025-08-04

## 2025-08-04 DIAGNOSIS — H34.8121: ICD-10-CM

## 2025-08-04 DIAGNOSIS — H35.733: ICD-10-CM

## 2025-08-04 DIAGNOSIS — H35.3221: ICD-10-CM

## 2025-08-04 PROCEDURE — 67028 INJECTION EYE DRUG: CPT

## 2025-08-04 PROCEDURE — J2777PFS VABYSMO PFS: Mod: JZ,LT

## 2025-08-04 PROCEDURE — 92250 FUNDUS PHOTOGRAPHY W/I&R: CPT
